# Patient Record
Sex: FEMALE | Race: BLACK OR AFRICAN AMERICAN | URBAN - METROPOLITAN AREA
[De-identification: names, ages, dates, MRNs, and addresses within clinical notes are randomized per-mention and may not be internally consistent; named-entity substitution may affect disease eponyms.]

---

## 2023-06-27 ENCOUNTER — APPOINTMENT (RX ONLY)
Dept: URBAN - METROPOLITAN AREA CLINIC 11 | Facility: CLINIC | Age: 53
Setting detail: DERMATOLOGY
End: 2023-06-27

## 2023-06-27 DIAGNOSIS — Z41.1 ENCOUNTER FOR COSMETIC SURGERY: ICD-10-CM

## 2023-06-27 PROCEDURE — 99003: CPT

## 2023-06-27 PROCEDURE — ? CONSULTATION - BLEPHAROPLASTY

## 2023-08-01 ENCOUNTER — APPOINTMENT (RX ONLY)
Dept: URBAN - METROPOLITAN AREA CLINIC 11 | Facility: CLINIC | Age: 53
Setting detail: DERMATOLOGY
End: 2023-08-01

## 2023-08-01 DIAGNOSIS — Z41.9 ENCOUNTER FOR PROCEDURE FOR PURPOSES OTHER THAN REMEDYING HEALTH STATE, UNSPECIFIED: ICD-10-CM

## 2023-08-01 PROCEDURE — ? PRESCRIPTION

## 2023-08-01 PROCEDURE — ? PRE-OP WORKLIST

## 2023-08-01 PROCEDURE — 99004: CPT

## 2023-08-01 PROCEDURE — Z1101: HCPCS

## 2023-08-01 RX ORDER — OXYCODONE HYDROCHLORIDE 5 MG/1
TABLET ORAL PRN
Qty: 20 | Refills: 0 | Status: ERX | COMMUNITY
Start: 2023-08-01

## 2023-08-01 RX ORDER — ONDANSETRON 4 MG/1
TABLET, ORALLY DISINTEGRATING ORAL PRN
Qty: 15 | Refills: 0 | Status: ERX | COMMUNITY
Start: 2023-08-01

## 2023-08-01 RX ORDER — KETOROLAC TROMETHAMINE 5 MG/ML
SOLUTION OPHTHALMIC
Qty: 10 | Refills: 0 | Status: ERX | COMMUNITY
Start: 2023-08-01

## 2023-08-01 RX ORDER — GABAPENTIN 300 MG/1
CAPSULE ORAL TID
Qty: 16 | Refills: 0 | Status: ERX | COMMUNITY
Start: 2023-08-01

## 2023-08-01 RX ORDER — MELOXICAM 7.5 MG/1
TABLET ORAL BID
Qty: 15 | Refills: 0 | Status: ERX | COMMUNITY
Start: 2023-08-01

## 2023-08-01 RX ADMIN — OXYCODONE HYDROCHLORIDE: 5 TABLET ORAL at 00:00

## 2023-08-01 RX ADMIN — ONDANSETRON: 4 TABLET, ORALLY DISINTEGRATING ORAL at 00:00

## 2023-08-01 RX ADMIN — KETOROLAC TROMETHAMINE: 5 SOLUTION OPHTHALMIC at 00:00

## 2023-08-01 RX ADMIN — GABAPENTIN: 300 CAPSULE ORAL at 00:00

## 2023-08-01 RX ADMIN — MELOXICAM: 7.5 TABLET ORAL at 00:00

## 2023-08-01 ASSESSMENT — LOCATION DETAILED DESCRIPTION DERM
LOCATION DETAILED: RIGHT LATERAL SUPERIOR EYELID
LOCATION DETAILED: RIGHT LATERAL INFERIOR EYELID
LOCATION DETAILED: LEFT LATERAL INFERIOR EYELID
LOCATION DETAILED: LEFT LATERAL SUPERIOR EYELID

## 2023-08-01 ASSESSMENT — LOCATION ZONE DERM: LOCATION ZONE: EYELID

## 2023-08-01 ASSESSMENT — LOCATION SIMPLE DESCRIPTION DERM
LOCATION SIMPLE: LEFT INFERIOR EYELID
LOCATION SIMPLE: LEFT SUPERIOR EYELID
LOCATION SIMPLE: RIGHT SUPERIOR EYELID
LOCATION SIMPLE: RIGHT INFERIOR EYELID

## 2023-08-01 NOTE — PROCEDURE: PRE-OP WORKLIST
Surgeon: Dr. Feliciano
Photos Taken?: yes
Detail Level: Simple
Surgery Scheduled: quad blepharoplasty, fat grafting
Date Of Surgery: 08/17/2023
Admission Status: outpatient

## 2023-08-01 NOTE — HPI: PREOPERATIVE APPOINTMENT (BRIEF)
Have Arrangements And Instructions Been Made For Patient Transportation?: Transportation arrangements have been made transportation_arranged_Transportation_arrangements_have_been_made

## 2023-08-02 ENCOUNTER — RX ONLY (OUTPATIENT)
Age: 53
Setting detail: RX ONLY
End: 2023-08-02

## 2023-08-02 RX ORDER — CLINDAMYCIN HYDROCHLORIDE 150 MG/1
CAPSULE ORAL
Qty: 14 | Refills: 0 | Status: ERX | COMMUNITY
Start: 2023-08-02

## 2023-08-17 ENCOUNTER — APPOINTMENT (RX ONLY)
Dept: URBAN - METROPOLITAN AREA CLINIC 181 | Facility: CLINIC | Age: 53
Setting detail: DERMATOLOGY
End: 2023-08-17

## 2023-08-17 DIAGNOSIS — Z41.1 ENCOUNTER FOR COSMETIC SURGERY: ICD-10-CM

## 2023-08-17 PROCEDURE — ? SET GLOBAL PERIOD

## 2023-08-17 PROCEDURE — 15821 BLEPHARP LWR EYELID FAT PAD: CPT | Mod: 50

## 2023-08-17 PROCEDURE — 15823 BLEPHARP UPR EYELID XCSV SKN: CPT | Mod: 50

## 2023-08-17 NOTE — PROCEDURE: SET GLOBAL PERIOD
Date Of Surgery (Mm/Dd/Yyyy): 08/17/2023
Surgery Global Period: 0
Other Surgery Performed: quad blepharoplasty and fat grafting
Detail Level: Detailed

## 2023-08-18 ENCOUNTER — APPOINTMENT (RX ONLY)
Dept: URBAN - METROPOLITAN AREA CLINIC 11 | Facility: CLINIC | Age: 53
Setting detail: DERMATOLOGY
End: 2023-08-18

## 2023-08-18 DIAGNOSIS — Z48.89 ENCOUNTER FOR OTHER SPECIFIED SURGICAL AFTERCARE: ICD-10-CM

## 2023-08-18 PROCEDURE — 99024 POSTOP FOLLOW-UP VISIT: CPT

## 2023-08-18 PROCEDURE — ? COUNSELING - POST-OP CHECK, FAT TRANSFER

## 2023-08-18 PROCEDURE — ? COUNSELING - POST-OP CHECK, BLEPHAROPLASTY

## 2023-08-18 ASSESSMENT — LOCATION DETAILED DESCRIPTION DERM
LOCATION DETAILED: LEFT CENTRAL MALAR CHEEK
LOCATION DETAILED: RIGHT LATERAL SUPERIOR EYELID
LOCATION DETAILED: RIGHT CENTRAL MALAR CHEEK
LOCATION DETAILED: LEFT LATERAL SUPERIOR EYELID
LOCATION DETAILED: LEFT LATERAL INFERIOR EYELID
LOCATION DETAILED: RIGHT LATERAL INFERIOR EYELID

## 2023-08-18 ASSESSMENT — LOCATION SIMPLE DESCRIPTION DERM
LOCATION SIMPLE: RIGHT SUPERIOR EYELID
LOCATION SIMPLE: LEFT SUPERIOR EYELID
LOCATION SIMPLE: LEFT INFERIOR EYELID
LOCATION SIMPLE: RIGHT INFERIOR EYELID
LOCATION SIMPLE: LEFT CHEEK
LOCATION SIMPLE: RIGHT CHEEK

## 2023-08-18 ASSESSMENT — LOCATION ZONE DERM
LOCATION ZONE: EYELID
LOCATION ZONE: FACE

## 2023-08-18 NOTE — HPI: POST-OP CHECK, COSMETIC (EXTENDED)
How Severe Is Your Pain?: mild
How Is Your Wound Healing?: fresh
Compared To The Last Evaluation, How Have The Findings Changed?: stable
Date Of Procedure: 8/17/2023

## 2023-08-18 NOTE — PROCEDURE: COUNSELING - POST-OP CHECK, FAT TRANSFER
Add Postop Global No-Charge Code (87179)?: yes
Detail Level: Simple
Count Minor/Major Decisions Toward Mdm (Not Cosmetic)?: No

## 2023-08-24 ENCOUNTER — APPOINTMENT (RX ONLY)
Dept: URBAN - METROPOLITAN AREA CLINIC 11 | Facility: CLINIC | Age: 53
Setting detail: DERMATOLOGY
End: 2023-08-24

## 2023-08-24 DIAGNOSIS — Z48.89 ENCOUNTER FOR OTHER SPECIFIED SURGICAL AFTERCARE: ICD-10-CM

## 2023-08-24 PROCEDURE — 99024 POSTOP FOLLOW-UP VISIT: CPT

## 2023-08-24 PROCEDURE — ? COUNSELING - POST-OP CHECK, BLEPHAROPLASTY

## 2023-08-24 PROCEDURE — ? COUNSELING - POST-OP CHECK, FAT TRANSFER

## 2023-08-24 PROCEDURE — ? SUTURE REMOVAL

## 2023-08-24 ASSESSMENT — LOCATION DETAILED DESCRIPTION DERM: LOCATION DETAILED: RIGHT LATERAL SUPERIOR EYELID

## 2023-08-24 ASSESSMENT — LOCATION ZONE DERM: LOCATION ZONE: EYELID

## 2023-08-24 ASSESSMENT — LOCATION SIMPLE DESCRIPTION DERM: LOCATION SIMPLE: RIGHT SUPERIOR EYELID

## 2023-08-24 NOTE — HPI: POST-OP CHECK, COSMETIC (EXTENDED)
What Best Describes The Level Of Symmetry? (Check All That Apply): good
How Is Your Wound Healing?: healing well
Compared To The Last Evaluation, How Have The Findings Changed?: improved
Date Of Procedure: 08/17/2023

## 2023-08-24 NOTE — PROCEDURE: COUNSELING - POST-OP CHECK, FAT TRANSFER
Detail Level: Simple
Count Minor/Major Decisions Toward Mdm (Not Cosmetic)?: No
Add Postop Global No-Charge Code (09786)?: yes

## 2023-09-27 ENCOUNTER — APPOINTMENT (RX ONLY)
Dept: URBAN - METROPOLITAN AREA CLINIC 11 | Facility: CLINIC | Age: 53
Setting detail: DERMATOLOGY
End: 2023-09-27

## 2023-09-27 DIAGNOSIS — L81.4 OTHER MELANIN HYPERPIGMENTATION: ICD-10-CM

## 2023-09-27 DIAGNOSIS — Z48.89 ENCOUNTER FOR OTHER SPECIFIED SURGICAL AFTERCARE: ICD-10-CM

## 2023-09-27 PROCEDURE — 99024 POSTOP FOLLOW-UP VISIT: CPT

## 2023-09-27 PROCEDURE — ? COUNSELING - POST-OP CHECK, FAT TRANSFER

## 2023-09-27 PROCEDURE — ? PATIENT SPECIFIC COUNSELING

## 2023-09-27 PROCEDURE — ? COUNSELING - POST-OP CHECK, BLEPHAROPLASTY

## 2023-09-27 ASSESSMENT — LOCATION DETAILED DESCRIPTION DERM
LOCATION DETAILED: RIGHT LATERAL SUPERIOR EYELID
LOCATION DETAILED: RIGHT LATERAL INFERIOR EYELID
LOCATION DETAILED: LEFT LATERAL SUPERIOR EYELID
LOCATION DETAILED: LEFT LATERAL INFERIOR EYELID

## 2023-09-27 ASSESSMENT — LOCATION SIMPLE DESCRIPTION DERM
LOCATION SIMPLE: LEFT INFERIOR EYELID
LOCATION SIMPLE: RIGHT INFERIOR EYELID
LOCATION SIMPLE: RIGHT SUPERIOR EYELID
LOCATION SIMPLE: LEFT SUPERIOR EYELID

## 2023-09-27 ASSESSMENT — LOCATION ZONE DERM: LOCATION ZONE: EYELID

## 2023-09-27 ASSESSMENT — SEVERITY ASSESSMENT: SEVERITY: MILD TO MODERATE

## 2023-09-27 NOTE — PROCEDURE: COUNSELING - POST-OP CHECK, FAT TRANSFER
Detail Level: Simple
Count Minor/Major Decisions Toward Mdm (Not Cosmetic)?: No
Add Postop Global No-Charge Code (45181)?: yes

## 2023-09-27 NOTE — HPI: POST-OP CHECK, COSMETIC (EXTENDED)
What Best Describes The Level Of Symmetry? (Check All That Apply): excellent
How Is Your Wound Healing?: healed
Compared To The Last Evaluation, How Have The Findings Changed?: improved
What Is Your Overall Satisfaction Level With The Current Outcome?: very satisfied
Date Of Procedure: 08/17/2023

## 2023-09-27 NOTE — PROCEDURE: PATIENT SPECIFIC COUNSELING
Other (Free Text): Patient was prescribed compound of 10% Hydroquinone/0.025% tretinoin/Vit. C 0.05% called into [facility]
Detail Level: Simple

## 2023-11-27 ENCOUNTER — APPOINTMENT (RX ONLY)
Dept: URBAN - METROPOLITAN AREA CLINIC 11 | Facility: CLINIC | Age: 53
Setting detail: DERMATOLOGY
End: 2023-11-27

## 2023-11-27 DIAGNOSIS — L81.4 OTHER MELANIN HYPERPIGMENTATION: ICD-10-CM | Status: IMPROVED

## 2023-11-27 DIAGNOSIS — Z48.89 ENCOUNTER FOR OTHER SPECIFIED SURGICAL AFTERCARE: ICD-10-CM

## 2023-11-27 PROCEDURE — ? PATIENT SPECIFIC COUNSELING

## 2023-11-27 PROCEDURE — ? COUNSELING - POST-OP CHECK, BLEPHAROPLASTY

## 2023-11-27 PROCEDURE — 99024 POSTOP FOLLOW-UP VISIT: CPT

## 2023-11-27 PROCEDURE — ? COUNSELING - POST-OP CHECK, FAT TRANSFER

## 2023-11-27 ASSESSMENT — LOCATION SIMPLE DESCRIPTION DERM
LOCATION SIMPLE: RIGHT INFERIOR EYELID
LOCATION SIMPLE: LEFT SUPERIOR EYELID
LOCATION SIMPLE: LEFT INFERIOR EYELID
LOCATION SIMPLE: RIGHT SUPERIOR EYELID

## 2023-11-27 ASSESSMENT — LOCATION DETAILED DESCRIPTION DERM
LOCATION DETAILED: RIGHT LATERAL SUPERIOR EYELID
LOCATION DETAILED: LEFT LATERAL INFERIOR EYELID
LOCATION DETAILED: RIGHT LATERAL INFERIOR EYELID
LOCATION DETAILED: LEFT LATERAL SUPERIOR EYELID

## 2023-11-27 ASSESSMENT — LOCATION ZONE DERM: LOCATION ZONE: EYELID

## 2023-11-27 NOTE — PROCEDURE: PATIENT SPECIFIC COUNSELING
Other (Free Text): Patient was prescribed compound of 10% Hydroquinone/0.025% tretinoin/Vit. C 0.05% and is to continue using TIW for 3 months
Detail Level: Simple

## 2023-11-27 NOTE — PROCEDURE: COUNSELING - POST-OP CHECK, FAT TRANSFER
Detail Level: Simple
Count Minor/Major Decisions Toward Mdm (Not Cosmetic)?: No
Add Postop Global No-Charge Code (20535)?: yes

## 2024-04-29 ENCOUNTER — APPOINTMENT (RX ONLY)
Dept: URBAN - METROPOLITAN AREA CLINIC 11 | Facility: CLINIC | Age: 54
Setting detail: DERMATOLOGY
End: 2024-04-29

## 2024-04-29 DIAGNOSIS — Z41.1 ENCOUNTER FOR COSMETIC SURGERY: ICD-10-CM

## 2024-04-29 PROCEDURE — 99005: CPT

## 2024-04-29 PROCEDURE — ? CONSULTATION - ABDOMINOPLASTY

## 2024-04-29 ASSESSMENT — LOCATION ZONE DERM: LOCATION ZONE: TRUNK

## 2024-04-29 ASSESSMENT — LOCATION SIMPLE DESCRIPTION DERM: LOCATION SIMPLE: ABDOMEN

## 2024-04-29 ASSESSMENT — LOCATION DETAILED DESCRIPTION DERM: LOCATION DETAILED: EPIGASTRIC SKIN

## 2025-05-29 ENCOUNTER — APPOINTMENT (OUTPATIENT)
Dept: URBAN - METROPOLITAN AREA CLINIC 11 | Facility: CLINIC | Age: 55
Setting detail: DERMATOLOGY
End: 2025-05-29

## 2025-05-29 DIAGNOSIS — Z41.1 ENCOUNTER FOR COSMETIC SURGERY: ICD-10-CM

## 2025-05-29 DIAGNOSIS — E66.9 OBESITY, UNSPECIFIED: ICD-10-CM

## 2025-05-29 DIAGNOSIS — L81.4 OTHER MELANIN HYPERPIGMENTATION: ICD-10-CM | Status: IMPROVED

## 2025-05-29 PROCEDURE — ? COUNSELING - OBESITY

## 2025-05-29 PROCEDURE — ? PATIENT SPECIFIC COUNSELING

## 2025-05-29 PROCEDURE — ? CONSULTATION - AESTHETIC FACIAL DEFORMITY

## 2025-05-29 PROCEDURE — 99024 POSTOP FOLLOW-UP VISIT: CPT

## 2025-05-29 ASSESSMENT — LOCATION ZONE DERM: LOCATION ZONE: TRUNK

## 2025-05-29 ASSESSMENT — LOCATION SIMPLE DESCRIPTION DERM: LOCATION SIMPLE: ABDOMEN

## 2025-05-29 ASSESSMENT — LOCATION DETAILED DESCRIPTION DERM: LOCATION DETAILED: PERIUMBILICAL SKIN

## 2025-05-29 NOTE — PROCEDURE: PATIENT SPECIFIC COUNSELING
Other (Free Text): Continue topical compound 3 mos on, 1 month off. Compound of 8% Hydroquinone/0.025% tretinoin/Vit. C 0.05% called into [facility].
Detail Level: Detailed

## 2025-05-29 NOTE — PROCEDURE: COUNSELING - OBESITY
Other Plan: Abdominoplasty if desired following weight loss treatment. Previous quote given.
Detail Level: Generalized
Count Minor/Major Decisions Toward Mdm (Not Cosmetic)?: No

## 2025-05-29 NOTE — PROCEDURE: CONSULTATION - AESTHETIC FACIAL DEFORMITY
Send Procedure Quote As Charge: No
Consultation Charge $ (Use Numbers Only, No Text Please.): 0
Other Plan: buccal fat pad removal and autologous fat transfer bilateral lower eyelids
Detail Level: Simple
Procedure Quote $ (Will Render In Note. Use Numbers Only, No Text Please.): 2264